# Patient Record
Sex: FEMALE | Race: BLACK OR AFRICAN AMERICAN | URBAN - METROPOLITAN AREA
[De-identification: names, ages, dates, MRNs, and addresses within clinical notes are randomized per-mention and may not be internally consistent; named-entity substitution may affect disease eponyms.]

---

## 2018-02-15 ENCOUNTER — OFFICE VISIT (OUTPATIENT)
Dept: FAMILY MEDICINE | Facility: CLINIC | Age: 83
End: 2018-02-15
Payer: COMMERCIAL

## 2018-02-15 VITALS
SYSTOLIC BLOOD PRESSURE: 98 MMHG | RESPIRATION RATE: 14 BRPM | OXYGEN SATURATION: 100 % | TEMPERATURE: 98.1 F | HEART RATE: 85 BPM | DIASTOLIC BLOOD PRESSURE: 72 MMHG

## 2018-02-15 DIAGNOSIS — Z02.89 HISTORY AND PHYSICAL EXAMINATION, IMMIGRATION: Primary | ICD-10-CM

## 2018-02-15 PROCEDURE — 99385 PREV VISIT NEW AGE 18-39: CPT | Performed by: FAMILY MEDICINE

## 2018-02-15 PROCEDURE — 87591 N.GONORRHOEAE DNA AMP PROB: CPT | Performed by: FAMILY MEDICINE

## 2018-02-15 PROCEDURE — 86762 RUBELLA ANTIBODY: CPT | Performed by: FAMILY MEDICINE

## 2018-02-15 PROCEDURE — 36415 COLL VENOUS BLD VENIPUNCTURE: CPT | Performed by: FAMILY MEDICINE

## 2018-02-15 PROCEDURE — 86780 TREPONEMA PALLIDUM: CPT | Performed by: FAMILY MEDICINE

## 2018-02-15 RX ORDER — MIRTAZAPINE 15 MG/1
15 TABLET, FILM COATED ORAL
COMMUNITY
Start: 2017-12-14 | End: 2018-03-14

## 2018-02-15 RX ORDER — QUETIAPINE FUMARATE 100 MG/1
TABLET, FILM COATED ORAL
COMMUNITY
Start: 2017-09-18 | End: 2018-02-15

## 2018-02-15 RX ORDER — IBUPROFEN 400 MG/1
400 TABLET, FILM COATED ORAL
COMMUNITY
Start: 2017-07-20

## 2018-02-15 RX ORDER — MIRTAZAPINE 15 MG/1
TABLET, FILM COATED ORAL
COMMUNITY
Start: 2018-02-14 | End: 2018-02-15

## 2018-02-15 RX ORDER — ACETAMINOPHEN 500 MG
500 TABLET ORAL
COMMUNITY
Start: 2017-12-14

## 2018-02-15 ASSESSMENT — PAIN SCALES - GENERAL: PAINLEVEL: NO PAIN (0)

## 2018-02-15 NOTE — MR AVS SNAPSHOT
"              After Visit Summary   2/15/2018    Udbi H Muhumed    MRN: [id number]           Patient Information     Date Of Birth          1928        Visit Information        Provider Department      2/15/2018 9:45 AM Thanh Reyes MD; LANGUAGE [facility]        Today's Diagnoses     History and physical examination, immigration    -  1       Follow-ups after your visit        Who to contact     If you have questions or need follow up information about today's clinic visit or your schedule please contact [facility] directly at [phone].  Normal or non-critical lab and imaging results will be communicated to you by [employer], letter or phone within 4 business days after the clinic has received the results. If you do not hear from us within 7 days, please contact the clinic through [employer] or phone. If you have a critical or abnormal lab result, we will notify you by phone as soon as possible.  Submit refill requests through [employer] or call your pharmacy and they will forward the refill request to us. Please allow 3 business days for your refill to be completed.          Additional Information About Your Visit        MyChart Information     [employer] lets you send messages to your doctor, view your test results, renew your prescriptions, schedule appointments and more. To sign up, go to [email or web address] . Click on \"Log in\" on the left side of the screen, which will take you to the Welcome page. Then click on \"Sign up Now\" on the right side of the page.     You will be asked to enter the access code listed below, as well as some personal information. Please follow the directions to create your username and password.     Your access code is: [email or web address]  Expires: 2018 11:59 AM     Your access code will  in 90 days. If you need help or a new code, please call your [facility] or [phone].        Care EveryWhere ID     This is your Care EveryWhere ID. " This could be used by other organizations to access your [facility] medical records  [id number]        Your Vitals Were     Pulse Temperature Respirations Pulse Oximetry          85 98.1  F (36.7  C) (Temporal) 14 100%         Blood Pressure from Last 3 Encounters:   02/15/18 98/72    Weight from Last 3 Encounters:   No data found for Wt              We Performed the Following     Anti Treponema     C IMMIGRATION PHYSICAL     Neisseria gonorrhoeae PCR     Rubella Antibody IgG Quantitative        Primary Care Provider Fax #    Physician No Ref-Primary [phone]       No address on file        Equal Access to Services     [facility] : Hadii aad ku hadasho Soomaali, waaxda luqadaha, qaybta kaalmada adeegyada, waxay monty haymicheln lucy watson . So [facility] [phone].    ATENCIÓN: Si habla español, tiene a abdi disposición servicios gratuitos de asistencia lingüística. Llame al [phone].    We comply with applicable federal civil rights laws and [place] laws. We do not discriminate on the basis of race, color, national origin, age, disability, sex, sexual orientation, or gender identity.            Thank you!     Thank you for choosing [facility]  for your care. Our goal is always to provide you with excellent care. Hearing back from our patients is one way we can continue to improve our services. Please take a few minutes to complete the written survey that you may receive in the mail after your visit with us. Thank you!             Your Updated Medication List - Protect others around you: Learn how to safely use, store and throw away your medicines at www.disposemymeds.org.          This list is accurate as of 2/15/18 11:59 AM.  Always use your most recent med list.                   Brand Name Dispense Instructions for use Diagnosis    acetaminophen 500 MG tablet    TYLENOL     Take 500 mg by mouth        ENSURE COMPLETE Liqd      Take 237 mL (1 BOTTLE)  by mouth 3 times daily.         ibuprofen 400 MG tablet    ADVIL/MOTRIN     Take 400 mg by mouth        * mirtazapine 15 MG tablet    REMERON     Take 15 mg by mouth        * mirtazapine 15 MG tablet    REMERON          omeprazole 20 MG CR capsule    priLOSEC          QUEtiapine 100 MG tablet    SEROquel          ranitidine 150 MG tablet    ZANTAC     Take 150 mg by mouth        * Notice:  This list has 2 medication(s) that are the same as other medications prescribed for you. Read the directions carefully, and ask your doctor or other care provider to review them with you.

## 2018-02-15 NOTE — PROGRESS NOTES
Kasey is here with her daughter and her son for INS physical exam.  Professional  was available to help with the translation. She moved from [facility]  in 7/2014. She lives in [place], [place] and is not working. Stated that overall she is doing well and has no concern today.  Denies of headache or dizziness.  No URI symptoms and denies of CP or SOB.  No fever or chill. No N/V/D/C.  No concern of STD and its risk.  No abnormal vaginal discharge nor having rash in the groin area.  Denies of depression and has no history of depression, anxiety or mood disorder. No history or current homicidal or suicidal ideation.  Denies of hallucination and has no history of psychiatric hospitalization.  No pain and has no problem with sleeping with Remeron.  Eating and drinking ok.  No problem with urination.  Denies of coughing or nigth sweat. No weight loss.  No exposure to TB or history of TB.  Has not had a positive PPD. Denies of using any kind of drug.      Problem list and histories reviewed & adjusted, as indicated.  Additional history: as documented      PAST MEDICAL HISTORY:   Past Medical History:   Diagnosis Date     GERD (gastroesophageal reflux disease)      Insomnia        PAST SURGICAL HISTORY:   Past Surgical History:   Procedure Laterality Date     NO HISTORY OF SURGERY         FAMILY HISTORY:   Family History   Problem Relation Age of Onset     Unknown/Adopted Mother      Unknown/Adopted Father      Unknown/Adopted Maternal Grandmother      Unknown/Adopted Maternal Grandfather      Unknown/Adopted Paternal Grandmother      Unknown/Adopted Paternal Grandfather        SOCIAL HISTORY:   Social History   Substance Use Topics     Smoking status: Never Smoker     Smokeless tobacco: Never Used     Alcohol use No        No Known Allergies    Current Outpatient Prescriptions   Medication Sig Dispense Refill     acetaminophen (TYLENOL) 500 MG tablet Take 500 mg by mouth       Nutritional Supplements (ENSURE COMPLETE)  LIQD Take 237 mL (1 BOTTLE)  by mouth 3 times daily.       ibuprofen (ADVIL/MOTRIN) 400 MG tablet Take 400 mg by mouth       mirtazapine (REMERON) 15 MG tablet Take 15 mg by mouth       omeprazole (PRILOSEC) 20 MG CR capsule        [DISCONTINUED] mirtazapine (REMERON) 15 MG tablet            ROS:  Constitutional, HEENT, cardiovascular, pulmonary, gi and gu systems are negative, except as otherwise noted.      OBJECTIVE:                                                      .Vitals: BP 98/72 (BP Location: Left arm, Patient Position: Sitting, Cuff Size: Adult Regular)  Pulse 85  Temp 98.1  F (36.7  C) (Temporal)  Resp 14  SpO2 100%  BMI= There is no height or weight on file to calculate BMI.   GENERAL: healthy, alert and no distress  EYES: Eyes grossly normal to inspection, PERRL and conjunctivae and sclerae normal  HENT: ear canals and TM's normal, nose and mouth without ulcers or lesions.  Nares are non-congested. Oropharynx is pink and moist. No tender with palpation to the sinuses.  NECK: no adenopathy, no palpable masses, or scars and thyroid normal to palpation  RESP: lungs clear to auscultation - no rales, rhonchi or wheezes  CV: regular rate and rhythm, no murmur, no peripheral edema and peripheral pulses strong  ABDOMEN: soft, nontender, no hepatosplenomegaly or palpable masses and bowel sounds normal  MS: no gross musculoskeletal defects noted, no edema. Walk with no limping, normal gait. All 4 extremities are equally in strength. Ankle, knees, hips, shoulders, elbows and wrists exams normal. Normal fine motor skills on fingers. Back is straight, no lordosis or scoliosis. No tender with palpation.  SKIN: no suspicious lesions or rashes  NEURO: Normal strength and tone, mentation intact and speech normal  PSYCH: mentation appears normal, affect normal/bright. No hallucination, suicidal or homicidal ideation    Diagnostic Test Results:      No results found for this or any previous visit.        ASSESSMENT/PLAN:                                                      1. Health examination of defined subpopulation  I personal reviewed the report of Medical Examination and Vaccination Record from the [employer] by [employer]. She has no chronic medical condition. Has not had a positive PPD test and she displayed no symptoms of active TB.  No exposure to TB that she knows of.  Will obtain PPD. Denies any risk for STD, but will check for RPR (syphyllis) and gonorrhea. She has no history of depression, anxiety, mood disorder and has never been diagnosed or hospitalized for any psychiatric disorders.  She displays no physical or mental disorders with associated harmful behavior. She denies of using drug. Reviewed her immunization record, labs as ordered.  Will fill out her paper on her behalf once the results are available. Instructed not to open the sealed envelope.  All of her questions were answered and encourage to call in if has any concern.    Also inform her that she should follow up with her primary provider for her routine and chronic medical care. I did not address any chronic medical problem today.  She understands.      ICD-10-CM    1. History and physical examination, immigration Z02.89 Anti Treponema     C IMMIGRATION PHYSICAL     Rubella Antibody IgG Quantitative     Neisseria gonorrhoeae PCR     F/U as needed.    Thanh Stafford Mai, MD  [facility]

## 2018-02-15 NOTE — NURSING NOTE
Chief Complaint   Patient presents with     Immigration Physical       Initial BP 98/72 (BP Location: Left arm, Patient Position: Sitting, Cuff Size: Adult Regular)  Pulse 85  Temp 98.1  F (36.7  C) (Temporal)  Resp 14  SpO2 100% There is no height or weight on file to calculate BMI.  Medication Reconciliation: complete     Phuong Pedersen MA 2/15/2018

## 2018-02-16 LAB
N GONORRHOEA DNA SPEC QL NAA+PROBE: NEGATIVE
RUBV IGG SERPL IA-ACNC: 16 IU/ML
SPECIMEN SOURCE: NORMAL
T PALLIDUM IGG+IGM SER QL: NEGATIVE

## 2018-02-19 ENCOUNTER — TELEPHONE (OUTPATIENT)
Dept: FAMILY MEDICINE | Facility: CLINIC | Age: 83
End: 2018-02-19

## 2018-02-19 ENCOUNTER — TRANSFERRED RECORDS (OUTPATIENT)
Dept: HEALTH INFORMATION MANAGEMENT | Facility: CLINIC | Age: 83
End: 2018-02-19

## 2018-02-19 NOTE — TELEPHONE ENCOUNTER
----- Message from Thanh Stafford Mai, MD sent at 2/16/2018  5:01 PM CST -----  Please let patient know that her test for gonorrhea, syphilis were normal.  She is also immune to rubella.  And waiting for the PPD test to complete her INS paper.

## 2018-02-26 NOTE — TELEPHONE ENCOUNTER
Spoke to patient's son. He states that they are still waiting for the results. I gave him our fax #, so he can have Dr office fax us the results.  Norma Austin CMA

## 2018-03-07 NOTE — TELEPHONE ENCOUNTER
Patients son drops off test results at [facility],  These are placed in Dr Reyes's mailbox. PLease call patient once all process's are complete.

## 2018-03-08 PROBLEM — S32.010A CLOSED COMPRESSION FRACTURE OF FIRST LUMBAR VERTEBRA (H): Status: ACTIVE | Noted: 2017-02-21

## 2018-03-08 PROBLEM — G47.9 SLEEP DISTURBANCE: Status: ACTIVE | Noted: 2017-02-06

## 2018-03-08 PROBLEM — K44.9 HIATAL HERNIA: Status: ACTIVE | Noted: 2017-02-21

## 2018-03-08 PROBLEM — B18.1 CHRONIC HEPATITIS B (H): Status: ACTIVE | Noted: 2017-03-13

## 2018-03-08 PROBLEM — S72.001K CLOSED FRACTURE OF NECK OF RIGHT FEMUR WITH NONUNION: Status: ACTIVE | Noted: 2017-02-21

## 2018-03-08 NOTE — TELEPHONE ENCOUNTER
Please let patient know that the INS paper has completed, it may picked up or mail as any time.  Thank you

## 2018-03-12 ENCOUNTER — TELEPHONE (OUTPATIENT)
Dept: FAMILY MEDICINE | Facility: CLINIC | Age: 83
End: 2018-03-12

## 2018-03-12 NOTE — TELEPHONE ENCOUNTER
Patient was informed that INS paperwork is ready for  at the [facility].  Nato Roberts, CMA
